# Patient Record
Sex: MALE | Race: WHITE | NOT HISPANIC OR LATINO | URBAN - METROPOLITAN AREA
[De-identification: names, ages, dates, MRNs, and addresses within clinical notes are randomized per-mention and may not be internally consistent; named-entity substitution may affect disease eponyms.]

---

## 2017-11-20 ENCOUNTER — EMERGENCY (EMERGENCY)
Facility: HOSPITAL | Age: 57
LOS: 1 days | Discharge: HOME | End: 2017-11-20
Attending: EMERGENCY MEDICINE | Admitting: EMERGENCY MEDICINE
Payer: COMMERCIAL

## 2017-11-20 VITALS
HEART RATE: 82 BPM | RESPIRATION RATE: 17 BRPM | TEMPERATURE: 98 F | DIASTOLIC BLOOD PRESSURE: 73 MMHG | OXYGEN SATURATION: 95 % | SYSTOLIC BLOOD PRESSURE: 157 MMHG | WEIGHT: 315 LBS

## 2017-11-20 VITALS — SYSTOLIC BLOOD PRESSURE: 161 MMHG | DIASTOLIC BLOOD PRESSURE: 81 MMHG | HEART RATE: 70 BPM

## 2017-11-20 LAB
ALBUMIN SERPL ELPH-MCNC: 4.3 G/DL — SIGNIFICANT CHANGE UP (ref 3.3–5)
ALP SERPL-CCNC: 64 U/L — SIGNIFICANT CHANGE UP (ref 40–120)
ALT FLD-CCNC: 27 U/L RC — SIGNIFICANT CHANGE UP (ref 10–45)
ANION GAP SERPL CALC-SCNC: 15 MMOL/L — SIGNIFICANT CHANGE UP (ref 5–17)
APPEARANCE UR: CLEAR — SIGNIFICANT CHANGE UP
AST SERPL-CCNC: 30 U/L — SIGNIFICANT CHANGE UP (ref 10–40)
BASOPHILS # BLD AUTO: 0.1 K/UL — SIGNIFICANT CHANGE UP (ref 0–0.2)
BASOPHILS NFR BLD AUTO: 0.7 % — SIGNIFICANT CHANGE UP (ref 0–2)
BILIRUB SERPL-MCNC: 1.9 MG/DL — HIGH (ref 0.2–1.2)
BILIRUB UR-MCNC: NEGATIVE — SIGNIFICANT CHANGE UP
BUN SERPL-MCNC: 12 MG/DL — SIGNIFICANT CHANGE UP (ref 7–23)
CALCIUM SERPL-MCNC: 8.6 MG/DL — SIGNIFICANT CHANGE UP (ref 8.4–10.5)
CHLORIDE SERPL-SCNC: 99 MMOL/L — SIGNIFICANT CHANGE UP (ref 96–108)
CO2 SERPL-SCNC: 24 MMOL/L — SIGNIFICANT CHANGE UP (ref 22–31)
COLOR SPEC: YELLOW — SIGNIFICANT CHANGE UP
CREAT SERPL-MCNC: 0.77 MG/DL — SIGNIFICANT CHANGE UP (ref 0.5–1.3)
DIFF PNL FLD: NEGATIVE — SIGNIFICANT CHANGE UP
EOSINOPHIL # BLD AUTO: 0.1 K/UL — SIGNIFICANT CHANGE UP (ref 0–0.5)
EOSINOPHIL NFR BLD AUTO: 0.6 % — SIGNIFICANT CHANGE UP (ref 0–6)
GLUCOSE SERPL-MCNC: 103 MG/DL — HIGH (ref 70–99)
GLUCOSE UR QL: NEGATIVE — SIGNIFICANT CHANGE UP
HCT VFR BLD CALC: 46.2 % — SIGNIFICANT CHANGE UP (ref 39–50)
HGB BLD-MCNC: 15.8 G/DL — SIGNIFICANT CHANGE UP (ref 13–17)
KETONES UR-MCNC: NEGATIVE — SIGNIFICANT CHANGE UP
LEUKOCYTE ESTERASE UR-ACNC: NEGATIVE — SIGNIFICANT CHANGE UP
LIDOCAIN IGE QN: 23 U/L — SIGNIFICANT CHANGE UP (ref 7–60)
LYMPHOCYTES # BLD AUTO: 3.2 K/UL — SIGNIFICANT CHANGE UP (ref 1–3.3)
LYMPHOCYTES # BLD AUTO: 30.9 % — SIGNIFICANT CHANGE UP (ref 13–44)
MCHC RBC-ENTMCNC: 31.2 PG — SIGNIFICANT CHANGE UP (ref 27–34)
MCHC RBC-ENTMCNC: 34.1 GM/DL — SIGNIFICANT CHANGE UP (ref 32–36)
MCV RBC AUTO: 91.3 FL — SIGNIFICANT CHANGE UP (ref 80–100)
MONOCYTES # BLD AUTO: 0.8 K/UL — SIGNIFICANT CHANGE UP (ref 0–0.9)
MONOCYTES NFR BLD AUTO: 7.6 % — SIGNIFICANT CHANGE UP (ref 2–14)
NEUTROPHILS # BLD AUTO: 6.2 K/UL — SIGNIFICANT CHANGE UP (ref 1.8–7.4)
NEUTROPHILS NFR BLD AUTO: 60.2 % — SIGNIFICANT CHANGE UP (ref 43–77)
NITRITE UR-MCNC: NEGATIVE — SIGNIFICANT CHANGE UP
PH UR: 6 — SIGNIFICANT CHANGE UP (ref 5–8)
PLATELET # BLD AUTO: 309 K/UL — SIGNIFICANT CHANGE UP (ref 150–400)
POTASSIUM SERPL-MCNC: 3.4 MMOL/L — LOW (ref 3.5–5.3)
POTASSIUM SERPL-SCNC: 3.4 MMOL/L — LOW (ref 3.5–5.3)
PROT SERPL-MCNC: 7.4 G/DL — SIGNIFICANT CHANGE UP (ref 6–8.3)
PROT UR-MCNC: SIGNIFICANT CHANGE UP
RBC # BLD: 5.06 M/UL — SIGNIFICANT CHANGE UP (ref 4.2–5.8)
RBC # FLD: 12.3 % — SIGNIFICANT CHANGE UP (ref 10.3–14.5)
SODIUM SERPL-SCNC: 138 MMOL/L — SIGNIFICANT CHANGE UP (ref 135–145)
SP GR SPEC: 1.02 — SIGNIFICANT CHANGE UP (ref 1.01–1.02)
UROBILINOGEN FLD QL: NEGATIVE — SIGNIFICANT CHANGE UP
WBC # BLD: 10.3 K/UL — SIGNIFICANT CHANGE UP (ref 3.8–10.5)
WBC # FLD AUTO: 10.3 K/UL — SIGNIFICANT CHANGE UP (ref 3.8–10.5)
WBC UR QL: SIGNIFICANT CHANGE UP /HPF (ref 0–5)

## 2017-11-20 PROCEDURE — 83690 ASSAY OF LIPASE: CPT

## 2017-11-20 PROCEDURE — 80053 COMPREHEN METABOLIC PANEL: CPT

## 2017-11-20 PROCEDURE — 74176 CT ABD & PELVIS W/O CONTRAST: CPT

## 2017-11-20 PROCEDURE — 96374 THER/PROPH/DIAG INJ IV PUSH: CPT

## 2017-11-20 PROCEDURE — 85027 COMPLETE CBC AUTOMATED: CPT

## 2017-11-20 PROCEDURE — 96375 TX/PRO/DX INJ NEW DRUG ADDON: CPT

## 2017-11-20 PROCEDURE — 82962 GLUCOSE BLOOD TEST: CPT

## 2017-11-20 PROCEDURE — 99284 EMERGENCY DEPT VISIT MOD MDM: CPT | Mod: 25

## 2017-11-20 PROCEDURE — 74176 CT ABD & PELVIS W/O CONTRAST: CPT | Mod: 26

## 2017-11-20 PROCEDURE — 81001 URINALYSIS AUTO W/SCOPE: CPT

## 2017-11-20 PROCEDURE — 99285 EMERGENCY DEPT VISIT HI MDM: CPT

## 2017-11-20 RX ORDER — ACETAMINOPHEN 500 MG
1000 TABLET ORAL ONCE
Qty: 0 | Refills: 0 | Status: COMPLETED | OUTPATIENT
Start: 2017-11-20 | End: 2017-11-20

## 2017-11-20 RX ORDER — POTASSIUM CHLORIDE 20 MEQ
40 PACKET (EA) ORAL ONCE
Qty: 0 | Refills: 0 | Status: COMPLETED | OUTPATIENT
Start: 2017-11-20 | End: 2017-11-20

## 2017-11-20 RX ORDER — ONDANSETRON 8 MG/1
4 TABLET, FILM COATED ORAL ONCE
Qty: 0 | Refills: 0 | Status: COMPLETED | OUTPATIENT
Start: 2017-11-20 | End: 2017-11-20

## 2017-11-20 RX ORDER — SODIUM CHLORIDE 9 MG/ML
1000 INJECTION INTRAMUSCULAR; INTRAVENOUS; SUBCUTANEOUS ONCE
Qty: 0 | Refills: 0 | Status: COMPLETED | OUTPATIENT
Start: 2017-11-20 | End: 2017-11-20

## 2017-11-20 RX ADMIN — ONDANSETRON 4 MILLIGRAM(S): 8 TABLET, FILM COATED ORAL at 20:20

## 2017-11-20 RX ADMIN — SODIUM CHLORIDE 1000 MILLILITER(S): 9 INJECTION INTRAMUSCULAR; INTRAVENOUS; SUBCUTANEOUS at 20:19

## 2017-11-20 RX ADMIN — Medication 40 MILLIEQUIVALENT(S): at 23:16

## 2017-11-20 RX ADMIN — Medication 400 MILLIGRAM(S): at 20:30

## 2017-11-20 RX ADMIN — Medication 1000 MILLIGRAM(S): at 20:45

## 2017-11-20 NOTE — ED ADULT NURSE REASSESSMENT NOTE - NS ED NURSE REASSESS COMMENT FT1
Patient update don plan of care.  Patient ambulated ot the bathroom and patient's bed linens straightened up.  Safety ensured.

## 2017-11-20 NOTE — ED ADULT NURSE NOTE - OBJECTIVE STATEMENT
57 year old male alert and oriented x 4 came to the ED c/o left lower back pain that began today.  Patient has a h/o kidney stones and said he started having dry heaves today, but no vomiting and left lower back pain that is 5/10 pain.  Patient took 1 oxycodone today about noon for pain which he said helped with pain.  Patient went to urgent care for symptoms and they sent him to the ED.  Patient denies; chest pain, shortness of breath, fevers, chills, 57 year old male alert and oriented x 4 came to the ED c/o left lower back pain that began today.  Patient has a h/o kidney stones and said he started having dry heaves today, but no vomiting and left lower back pain that is 5/10 pain.  Patient took 1 oxycodone today about noon for pain which he said helped with pain.  Patient went to urgent care for symptoms and they sent him to the ED.  Patient denies; chest pain, shortness of breath, fevers, chills, pain or burning on urination, blood in urine.  Patient said he has a good stream of urine when he urinates.  Patient has some left sided CVA tenderness.  L/s clear b/l with equal and symmetrical chest rise.  Patient said he has not taken his BP meds for one day, but is otherwise compliant with his medications.  Safety ensured.

## 2017-11-20 NOTE — ED PROVIDER NOTE - MEDICAL DECISION MAKING DETAILS
56 yo M hx nephrolithiasis, HTN, DM, presenting with L lower back pain nephrolithiasis vs musculoskeletal back pain. U/A, will get CT abd given hx stones requiring surgical removal and stent, CBC, CMP. Denies fevers/chills, will tx pain with IV Tylenol and reassess, zofran for nausea.

## 2017-11-20 NOTE — ED PROVIDER NOTE - OBJECTIVE STATEMENT
56 yo M hx HTN, DM2, nephrolithiasis 5 years ago requiring lithotripsy, stone removal and stent), presenting with 2 d L sided sharp constant lower back pain. Pt states feels similar to his last episode of nephrolithiasis. Reports one episode vomiting this morning after drinking coffee. Denies dysuria/hesitancy/increased frequency urination. Took 600 motrin this morning, and oxycodone 12 pm without relief. Pt states pain slightly relieved with laying down. Worse when on his feet. Works as . Denies any recent trauma, no recent twisting motion. Denies history of cancer. "This doesn't feel like that type of back pain." Denies radiation into flank or lower legs. Denies CP/SOB/abdominal pain. Reports going to urgent care before coming with detection trace blood in urine.     Pt PCP and Urologist are in [place] as pt lives in [place] but works in [place].

## 2017-11-20 NOTE — ED PROVIDER NOTE - PROGRESS NOTE DETAILS
Attending MD Hernandez: Patient re-evaluated and feeling improved.  No acute issues at  this time.  Lab and radiology tests reviewed with patient.  Incidental findings discussed with patient both from CT and Bili 1.9.  Given report of blood in urine at urgent care,  recommended outpatient uro follow up as well as PMD.  Patient stable for discharge.  Declined pain medication other than OTC.  Follow up instructions given to follow up with PMD in 24-48hrs, importance of follow up emphasized, return to ED parameters reviewed and patient verbalized understanding.  All questions answered, all concerns addressed.

## 2017-11-20 NOTE — ED PROVIDER NOTE - ATTENDING CONTRIBUTION TO CARE
Attending MD Hernandez: I personally have seen and examined this patient.  Resident note reviewed and agree on plan of care and except where noted.  See below for details.     57M with PMH including HTN, DM, nephrolithiasis s/p lithotripsy, removal and stent presents to the ED with L back pain. Reports has had 2 days of constant, sharp, L lower back pain.  Denies radiation into flank or legs.  Reports feels like previous episode of nephrolithiasis which was 5 years ago.  Reports emesis this AM after coffee.  Reports took Motrin and Oxycodone without improvement of pain.  Reports mild relief with laying supine, worse with standing, but no comfortable position with complete relief.  Reports works as , denies trauma, inciting event, heavy lifting. Reports has had back pain in the past but reports this feels different than that and feels more like his stone.  Reports went to urgent care where they did a UA which showed trace blood and was sent in to the ED.  Denies difficulty urinating, dysuria, increased frequency or urgency.  Denies chest pain, shortness of breath, palpitations. Denies abdominal pain, diarrhea, blood in stools.  Denies numbness/weakness/tingling in extremities.  Denies loss of urinary or bowel continence. Denies fevers, chills, dizziness, weakness.  On exam, head NCAT, PERRL, FROM at neck, no tenderness to palpation or stepoffs along length of spine, +L paraspinal tenderness in LS, lungs CTAB with good inspiratory effort, +S1S2, no m/r/g, abdomen soft with +BS, NT, ND, +L CVAT, moving all extremities with 5/5 strength bilateral upper and lower extremities, good and equal  strength bilaterally, sensory grossly intact including no saddle anesthesia; A/P: 57M with lower back pain and L CVAT with history of nephrolithiasis, Ddx includes nephrolithiasis and MSK, will obtain CT A/P stone hunt, will obtain labs, give pain control and antiemetic, reassess

## 2019-05-15 NOTE — ED ADULT NURSE NOTE - PAIN: PRESENCE, MLM
[de-identified] : Patient previously seen in November 2018 for hearing loss and cerumen impaction. Reports his ears are clogged again today.  Denies otalgia, otorrhea, tinnitus, or vertigo.  Patient has no previous otologic history or acoustic trauma.\par 2018 Audiogram showed sloping sensorineural hearing loss with a left conductive overlay, he was not interested in augmentation\par History of objective sleep apnea status post UPPP, tonsillectomy, tongue base  ablation versus CPAP, complains of leak. His ERICK is managed elsewhere complains of pain/discomfort

## 2019-11-22 NOTE — ED ADULT NURSE REASSESSMENT NOTE - NS ED NURSE REASSESS COMMENT FT1
Pulmonary Function Test and Echocardiogram at [facility] on Friday, November 29th 2019. Check-in 7:15am.        Patient asked to provide urine sample.  Patient educated on clean catch process.  Patient did not need ot go to the bathroom at this time.  Safety ensured.

## 2021-10-26 NOTE — ED PROVIDER NOTE - NS ED MD EM SELECTION
10/26/21 Faxed referral/order, clinic notes (F2F) and [facility] [phone]. [place] [facility] [place].    Adriana Wright  Care Coordinator    
[facility] phone call message - order or referral request from patient:     Order or referral being requested: Requested order     Additional Details: [facility] Care Coordinator, calling to inform Dr. Lin that [facility] is no longer taking new clients due to staffing. Tameka stated that another agency, [facility], will consider taking the patient but need the following first: Face to face, HNP, Med List, and skilled nursing orders. Please fax to [phone].    Referral only -Specialty N/A Location N/A    OK to leave a message on voice mail? Yes    Primary language: English      needed? No    Call taken on October 25, 2021 at 2:28 PM by Roxanna Lopez    Order request route to P [facility] TRIAGE   Referrals Route to P [facility] (Green/[place]/Purple) CARE COORDINATOR        
Routing to CC to advise.     Natanael Vera RN    
[phone]

## 2023-10-09 NOTE — ED ADULT NURSE NOTE - CAS TRG GENERAL NORM CIRC DET
Quality 110: Preventive Care And Screening: Influenza Immunization: Influenza Immunization Administered during Influenza season Quality 130: Documentation Of Current Medications In The Medical Record: Current Medications Documented Detail Level: Detailed Quality 111:Pneumonia Vaccination Status For Older Adults: Pneumococcal vaccine (PPSV23) administered on or after patient’s 60th birthday and before the end of the measurement period Strong peripheral pulses